# Patient Record
Sex: MALE | Race: WHITE
[De-identification: names, ages, dates, MRNs, and addresses within clinical notes are randomized per-mention and may not be internally consistent; named-entity substitution may affect disease eponyms.]

---

## 2017-10-04 ENCOUNTER — HOSPITAL ENCOUNTER (OUTPATIENT)
Dept: HOSPITAL 80 - FIMAGING | Age: 42
End: 2017-10-04
Attending: ORTHOPAEDIC SURGERY
Payer: COMMERCIAL

## 2017-10-04 DIAGNOSIS — M25.872: ICD-10-CM

## 2017-10-04 DIAGNOSIS — M76.72: ICD-10-CM

## 2017-10-04 DIAGNOSIS — S92.142A: ICD-10-CM

## 2017-10-04 DIAGNOSIS — M76.822: Primary | ICD-10-CM

## 2017-10-04 DIAGNOSIS — M77.52: ICD-10-CM

## 2019-04-12 ENCOUNTER — HOSPITAL ENCOUNTER (EMERGENCY)
Dept: HOSPITAL 80 - FED | Age: 44
Discharge: HOME | End: 2019-04-12
Payer: COMMERCIAL

## 2019-04-12 VITALS — DIASTOLIC BLOOD PRESSURE: 80 MMHG | SYSTOLIC BLOOD PRESSURE: 143 MMHG

## 2019-04-12 DIAGNOSIS — Z96.641: ICD-10-CM

## 2019-04-12 DIAGNOSIS — R50.82: Primary | ICD-10-CM

## 2019-04-12 LAB — PLATELET # BLD: 203 10^3/UL (ref 150–400)

## 2019-04-12 NOTE — EDPHY
H & P


Stated Complaint: FEVER 72 HR POST OF R HIP REPLAEMENT


Time Seen by Provider: 04/12/19 21:40


HPI/ROS: 





CHIEF COMPLAINT:  Fever





HISTORY OF PRESENT ILLNESS:  43-year-old male s/p right hip replacement 3 days 

ago presents with fever.  Onset of fatigue this afternoon, followed by fever to 

102 at home.  Associated with chills.  No other associated sx, specifically no 

cough or SOB.  Lives in [place] and oxygen saturation was 85% on room air at 

elevation.  History of aspiration pneumonia x3 in the postop period, concern 

for recurrent aspiration pneumonia.  Admitted in 8/2018 for post-op pneumonia, d

/c'd home on Augmentin.  Rt hip is gradually improving as expected, mild pain, 

stopped taking Percocet, now on Tylenol. No URI symptoms, abdominal pain or 

urinary symptoms.





REVIEW OF SYSTEMS:  complete 10 point ROS reviewed and is negative except for 

the noted elements in the HPI





Source: Patient





- Personal History


Current Tetanus Diphtheria and Acellular Pertussis (TDAP): Yes





- Medical/Surgical History


Hx Asthma: Yes


Hx Chronic Respiratory Disease: No


Hx Diabetes: No


Hx Cardiac Disease: No


Hx Renal Disease: No


Hx Cirrhosis: No


Hx Alcoholism: No


Hx HIV/AIDS: No


Hx Splenectomy or Spleen Trauma: No


Other PMH: asthma, migraines, bilat hip and r shoulder surgery, R HIP 

REPLACEMENT APRIL, 2019





- Family History


Significant Family History: No pertinent family hx





- Social History


Smoking Status: Former smoker


Alcohol Use: Sober


Drug Use: None


Additional Social History: 





 with kids


Employment: charge nurse on the oncology floor at [employer]





- Physical Exam


Exam: 





General Appearance:  Alert, quite pleasant and talkative, nontoxic-appearing


Eyes:  Pupils equal and round, no conjunctival pallor or injection


ENT, Mouth:  Mucous membranes moist


Neck:  Normal inspection


Respiratory:  Lungs are clear to auscultation


Cardiovascular:  Regular rate and rhythm


Gastrointestinal:  Abdomen is soft and nontender


Neurological:  A&O, nonfocal exam


Skin:  Warm and dry


Extremities:  Right hip-swelling and faint erythema/ecchymosis present


Psychiatric:  Mood and affect normal





Constitutional: 


 Initial Vital Signs











Temperature (C)  37.2 C   04/12/19 21:14


 


Heart Rate  73   04/12/19 21:14


 


Respiratory Rate  16   04/12/19 21:14


 


Blood Pressure  133/52 H  04/12/19 21:14


 


O2 Sat (%)  94   04/12/19 21:14








 











O2 Delivery Mode               Room Air














Allergies/Adverse Reactions: 


 





No Known Allergies Allergy (Unverified 06/28/12 09:04)


 








Home Medications: 














 Medication  Instructions  Recorded


 


Budesonide/Formoterol 160/4.5 2 puffs IH BID PRN 01/07/15





[Symbicort 160-4.5 Mcg Inh (*)]  


 


Levalbuterol Inhaler [Xopenex Hfa 2 puffs IH PRN PRN 01/07/15





Inhaler (*)]  


 


Diazepam 5 mg PO Q6H PRN 09/28/16


 


NAPROXEN 500 mg PO BID 09/28/16


 


oxyCODONE HCL/ACETAMINOPHEN 1 each PO Q6H PRN 09/28/16





[Percocet  mg Tablet]  


 


Amoxicillin/Clavulanate Pot 875 mg PO BID #16 tab 09/30/16





[Augmentin 875 MG TAB (*)]  


 


Ipratropium/Albuterol [Duoneb (*)] 3 ml IH QID PRN #30 deyvial 09/30/16


 


Levalbuterol 0.63 mg [Xopenex 0.63 mg IH Q6HRS PRN #30 deyvial 09/30/16





0.63MG Neb (*)]  


 


Magnesium Hydroxide [Milk of 30 ml PO DAILY PRN #0 udcup 09/30/16





Magnesia (*)]  


 


Pantoprazole Sodium [Protonix 40mg 40 mg PO BID #60 tab 09/30/16





(*)]  


 


Polyethylene Glycol 3350 [Miralax 17 gm PO DAILY PRN #0 pkt 09/30/16





17 gm (*)]  


 


Sennosides/Docusate Sodium 1 - 2 tab PO BID #0 tab 09/30/16





[Senokot-S]  


 


Amoxicillin/Clavulanate Pot 875 mg PO BID #20 tab 04/12/19





[Augmentin 875 MG TAB (RX)]  














Medical Decision Making





- Diagnostics


Imaging Results: 


CXR: NAD





Imaging: I viewed and interpreted images myself


ED Course/Re-evaluation: 





Luis presents with post-op fever.  Physical exam is unremarkable without 

localizing signs.  Rt hip appears normal for 3 days post-op, no evidence of 

infection.  All studies in ED unremarkable, including normal WBC, CXR and UA.  

Bld cx's and influenza swab pending.  Concern for recurrent aspiration pneumonia

, will place pt on Augmentin. 1st dose given in ED.  Pt is a very reliable pt 

and clearly understands reasons to return.    


Differential Diagnosis: 





includes though not limited to pneumonia, UTI, hip infection, cellulitis, 

influenza








- Data Points


Laboratory Results: 


 Laboratory Results





 04/12/19 21:34 





 04/12/19 21:34 








Medications Given: 


 








Discontinued Medications





Amoxicillin/Clavulanate Potassium (Augmentin 875mg)  875 mg PO EDNOW ONE


   PRN Reason: Protocol


   Stop: 04/12/19 22:22


   Last Admin: 04/12/19 22:48 Dose:  875 mg


Amoxicillin/Clavulanate Potassium (Augmentin 875mg)  875 mg PO EDNOW ONE


   PRN Reason: Protocol


   Stop: 04/12/19 22:24


   Last Admin: 04/12/19 22:47 Dose:  875 mg


Amoxicillin/Clavulanate Potassium (Augmentin 875mg)  875 mg PO EDNOW ONE


   PRN Reason: Protocol


   Stop: 04/12/19 22:43


   Last Admin: 04/12/19 22:48 Dose:  875 mg








Departure





- Departure


Disposition: Home, Routine, Self-Care


Clinical Impression: 


 Postoperative fever





Condition: Good


Instructions:  Fever in Adults (ED)


Additional Instructions: 


Your tests in the ED are normal.  Influenza swab and blood cultures are 

pending.  


I have prescribed Augmentin because of your history of postoperative aspiration 

pneumonia.


Return for shortness of breath, new symptoms that suggest an alternative 

infection or any concerns.





Referrals: 


Fareed Baxter DO [Primary Care Provider] - As per Instructions


Prescriptions: 


Amoxicillin/Clavulanate Pot [Augmentin 875 MG TAB (RX)] 875 mg PO BID #20 tab